# Patient Record
Sex: MALE | Race: OTHER | HISPANIC OR LATINO | URBAN - METROPOLITAN AREA
[De-identification: names, ages, dates, MRNs, and addresses within clinical notes are randomized per-mention and may not be internally consistent; named-entity substitution may affect disease eponyms.]

---

## 2020-03-27 ENCOUNTER — EMERGENCY (EMERGENCY)
Facility: HOSPITAL | Age: 50
LOS: 1 days | Discharge: HOME | End: 2020-03-27
Attending: EMERGENCY MEDICINE
Payer: SELF-PAY

## 2020-03-27 VITALS
TEMPERATURE: 99 F | RESPIRATION RATE: 20 BRPM | DIASTOLIC BLOOD PRESSURE: 92 MMHG | HEIGHT: 72 IN | SYSTOLIC BLOOD PRESSURE: 137 MMHG | HEART RATE: 90 BPM | WEIGHT: 220.02 LBS | OXYGEN SATURATION: 97 %

## 2020-03-27 PROCEDURE — 99282 EMERGENCY DEPT VISIT SF MDM: CPT

## 2020-03-27 PROCEDURE — 99284 EMERGENCY DEPT VISIT MOD MDM: CPT

## 2020-03-27 PROCEDURE — T1013: CPT

## 2020-03-27 NOTE — ED PROVIDER NOTE - PROGRESS NOTE DETAILS
advised on symptoms being consistent with covid-19 infection. advised on self quarantine, conservative management of symptoms with otc medications, and given strict return percautions. pt verbalizes understanding

## 2020-03-27 NOTE — ED ADULT TRIAGE NOTE - CHIEF COMPLAINT QUOTE
cough and felt warm for 8 days. already made appointment for virus testing. Wants to be seen now anyway. lung pain when cough

## 2020-03-27 NOTE — ED PROVIDER NOTE - CLINICAL SUMMARY MEDICAL DECISION MAKING FREE TEXT BOX
71 yo male with 5 days of cough abdominal pain diarrhea and anosmia. symptoms consistent with a most likely covid infection advised on quarantine, conservative management and strict  return percautions

## 2020-03-27 NOTE — ED PROVIDER NOTE - ATTENDING CONTRIBUTION TO CARE
fever, sweats, dry cough, loss of taste and diarrhea for 8 days with assoc abdominal cramps.  Non-smoker.  No recent inernational travel. No contact with confirmed COVID case.  SpO2 97% on room air.  Nontoxic appearing, NARD.  A/P suspect COVID: anticipatory guidance provided.

## 2020-03-27 NOTE — ED PROVIDER NOTE - OBJECTIVE STATEMENT
50 yo male hx of htn on losartan/hctz presenting with 5 days of diarrhea everyday, cough nonproductive and subjective fevers. + sick contact in the household. - travel. no chest pain + mild sob. no vomiting. states that he has mild lower abdominal pain. + loss of taste. denies chest pain.   non smoker   pcp jessica

## 2020-03-27 NOTE — ED PROVIDER NOTE - PATIENT PORTAL LINK FT
You can access the FollowMyHealth Patient Portal offered by [facility] by registering at the following website: [email or web address]. By joining [employer]’s FollowMyHealth portal, you will also be able to view your health information using other applications (apps) compatible with our system.

## 2020-03-27 NOTE — ED PROVIDER NOTE - PHYSICAL EXAMINATION
nontoxic appearing male no apparent respiratory or physical distress   heart rrr no obvious murmurs   lungs no distress rr20. mild crackles. no rhonchi  abd soft nd nttp no rebound or guarding

## 2022-08-04 ENCOUNTER — APPOINTMENT (OUTPATIENT)
Dept: FAMILY MEDICINE | Facility: CLINIC | Age: 52
End: 2022-08-04